# Patient Record
(demographics unavailable — no encounter records)

---

## 2025-02-27 NOTE — REASON FOR VISIT
[Post Hospitalization] : a post hospitalization visit [FreeTextEntry1] : 2 episodes of lower GI bleeding requiring hospitalization first in 10/24 where she was transfused with 1 unit of packed red cells and second in 12/24

## 2025-02-27 NOTE — HISTORY OF PRESENT ILLNESS
[de-identified] : No recent endoscopy. [FreeTextEntry1] : Attempt at colonoscopy in 2019 resulted in perforation and surgery.

## 2025-02-27 NOTE — PHYSICAL EXAM
[Alert] : alert [Normal Voice/Communication] : normal voice/communication [Healthy Appearing] : healthy appearing [No Acute Distress] : no acute distress [Sclera] : the sclera and conjunctiva were normal [Hearing Threshold Finger Rub Not [place]] : hearing was normal [Normal Lips/Gums] : the lips and gums were normal [Oropharynx] : the oropharynx was normal [Normal Appearance] : the appearance of the neck was normal [No Neck Mass] : no neck mass was observed [No Respiratory Distress] : no respiratory distress [No Acc Muscle Use] : no accessory muscle use [Respiration, Rhythm And Depth] : normal respiratory rhythm and effort [Auscultation Breath Sounds / Voice Sounds] : lungs were clear to auscultation bilaterally [Heart Rate And Rhythm] : heart rate was normal and rhythm regular [Normal S1, S2] : normal S1 and S2 [Murmurs] : no murmurs [+2] : edema: +2 [Bowel Sounds] : normal bowel sounds [Abdomen Tenderness] : non-tender [No Masses] : no abdominal mass palpated [Abdomen Soft] : soft [] : no hepatosplenomegaly [Oriented To Time, Place, And Person] : oriented to person, place, and time [de-identified] : Frail appearing.  Fairly robust.  Seated in wheelchair.  No acute distress.  Coherent.  Responsive.  Moves all extremities. [FreeTextEntry1] : Anicteric sclera. [de-identified] : Moist mucosa.  No pharyngitis. [de-identified] : No neck masses.  No adenopathy. [de-identified] : Clear to A&P. [de-identified] : Loud 3 of the 6 systolic ejection murmur at aortic area.  Consistent with aortic stenosis. [de-identified] : No cyanosis clubbing or edema. [de-identified] : Flat soft bowel sounds present.  No organomegaly.  No mass tenderness or rebound. [de-identified] : Not performed today.

## 2025-02-27 NOTE — ASSESSMENT
[FreeTextEntry1] : Recurrent diverticular hemorrhage of colon.  4 such episodes documented over roughly 2 years.  History of colonic perforation with colonoscopy 5 years ago.  Recent attempt at colonoscopy was unsuccessful.  Sigmoid diverticulosis identified.  No discrete bleeding site identified.  Tight turn in colon negotiated.  Procedure aborted at 40 cm.  Without complication. Vital signs prior to discharge so blood pressure of 110/70 and pulse of 80. Currently tolerates regular diet along with prunes. Patient is due for follow-up next week with hematology and repeat blood work.  Given dramatic improvement in hemoglobin, I have advised discontinuation of the iron supplement.  This should alleviate some of the constipation issue.  Maintain adequate hydration.  Continue Benefiber.  3 doses per day. GI office follow-up here in 4 months.